# Patient Record
Sex: FEMALE | Race: WHITE
[De-identification: names, ages, dates, MRNs, and addresses within clinical notes are randomized per-mention and may not be internally consistent; named-entity substitution may affect disease eponyms.]

---

## 2018-08-21 ENCOUNTER — HOSPITAL ENCOUNTER (EMERGENCY)
Dept: HOSPITAL 31 - C.ER | Age: 28
LOS: 1 days | Discharge: HOME | End: 2018-08-22
Payer: SELF-PAY

## 2018-08-21 DIAGNOSIS — S39.012A: Primary | ICD-10-CM
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
## 2018-08-21 DIAGNOSIS — N39.0: ICD-10-CM
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
## 2018-08-21 NOTE — C.PDOC
History Of Present Illness


Patient comes in complaining of left sided back pain since earlier today. She 

states the pain is worse with movement and denies any trauma or injury to the 

area. She also denies any weakness, numbness, dysuria, or hematuria. Patient 

denies any oral contraceptive use or being sexually active. 


Time Seen by Provider: 08/21/18 23:34


Chief Complaint (Nursing): Back Pain


History Per: Patient


History/Exam Limitations: no limitations


Onset/Duration Of Symptoms: Hrs


Current Symptoms Are (Timing): Still Present


Quality Of Discomfort: "Pain"


Severity: Mild


Pain Scale Rating Of: 3


Associated Symptoms: denies: Incontinence, New Weakness, New Numbness


Exacerbating Factor(s): Movement


Recent travel outside of the [place]: No


Additional History Per: Patient





Past Medical History


Reviewed: Historical Data, Nursing Documentation, Vital Signs


Vital Signs: 


 Last Vital Signs











Temp  98.2 F   08/21/18 22:40


 


Pulse  70   08/21/18 22:40


 


Resp  20   08/21/18 22:40


 


BP  142/93 H  08/21/18 22:40


 


Pulse Ox  98   08/21/18 23:52














- Medical History


PMH: No Chronic Diseases


Surgical History: No Surg Hx


Family History: States: No Known Family Hx





- Social History


Hx Alcohol Use: No


Hx Substance Use: No





Review Of Systems


Constitutional: Negative for: Fever, Chills


Cardiovascular: Negative for: Chest Pain


Respiratory: Negative for: Shortness of Breath


Genitourinary: Negative for: Dysuria, Hematuria


Musculoskeletal: Positive for: Back Pain


Neurological: Negative for: Weakness, Numbness





Physical Exam





- Physical Exam


Appears: Non-toxic


Skin: Warm, Dry


Oral Mucosa: Moist


Neck: Supple


Chest: Symmetrical


Cardiovascular: Rhythm Regular


Respiratory: No Rales, No Rhonchi, No Wheezing


Gastrointestinal/Abdominal: Soft, No Tenderness, No Distention, No Guarding, No 

Rebound


Back: CVA Tenderness (left), No Vertebral Tenderness, No Straight Leg Raising


Extremity: Normal ROM


Neurological/Psych: Oriented x3


Gait: Steady





ED Course And Treatment


O2 Sat by Pulse Oximetry: 98 (RA)


Pulse Ox Interpretation: Normal


Progress Note: Urinalysis and urine HCG ordered.





Disposition


Counseled Patient/Family Regarding: Studies Performed, Diagnosis, Need For 

Followup





- Disposition


Referrals: 


[facility] [Outside]


[facility] [Outside]


Disposition: HOME/ ROUTINE


Disposition Time: 23:34


Condition: FAIR


Additional Instructions: 


Please return if symptoms recur


Prescriptions: 


Naproxen [Naprosyn] 1 tab PO BID PRN #25 tab


 PRN Reason: Pain


Nitrofurantoin Macrocrystals [Macrobid] 1 cap PO BID #14 cap


Instructions:  Urinary Tract Infection, Adult (DC)


Forms:  [facility] (English)





- Clinical Impression


Clinical Impression: 


 Low back strain, UTI (urinary tract infection)








- Scribe Statement


The provider has reviewed the documentation as recorded by the Richy Santiago


Provider Attestation: 


All medical record entries made by the Richy were at my direction and 

personally dictated by me. I have reviewed the chart and agree that the record 

accurately reflects my personal performance of the history, physical exam, 

medical decision making, and the department course for this patient. I have 

also personally directed, reviewed, and agree with the discharge instructions 

and disposition.

## 2018-08-22 VITALS
TEMPERATURE: 98.4 F | HEART RATE: 95 BPM | RESPIRATION RATE: 16 BRPM | DIASTOLIC BLOOD PRESSURE: 88 MMHG | OXYGEN SATURATION: 100 % | SYSTOLIC BLOOD PRESSURE: 128 MMHG

## 2018-08-22 LAB
BACTERIA #/AREA URNS HPF: (no result) /[HPF]
BILIRUB UR-MCNC: NEGATIVE MG/DL
GLUCOSE UR STRIP-MCNC: NORMAL MG/DL
HCG,QUALITATIVE URINE: NEGATIVE
LEUKOCYTE ESTERASE UR-ACNC: (no result) LEU/UL
PH UR STRIP: 7 [PH] (ref 5–8)
PROT UR STRIP-MCNC: NEGATIVE MG/DL
RBC # UR STRIP: (no result) /UL
SP GR UR STRIP: 1 (ref 1–1.03)
SQUAMOUS EPITHIAL: 1 /HPF (ref 0–5)
UROBILINOGEN UR-MCNC: NORMAL MG/DL (ref 0.2–1)